# Patient Record
(demographics unavailable — no encounter records)

---

## 2020-08-27 NOTE — SLEEP CARE CONSULTATION
Information from patient questionnaire entered by Olya Duarte.





I have reviewed and concur with the information entered by Olya Duarte. 

This document represents the service I personally performed and the decisions 

made by me, Ute Yanes ARNP.





History of Present Illness


Service Date and Time: 08/27/2020    0810


Reason for Visit: New patient


Chief Complaint: reports: Snoring (only on back or right side), Observed pauses 

in breathing (wife says so).  denies: Insomnia, Unrefreshed sleep, Excessive 

daytime sleepiness, Fatigue


Date of Onset: maybe a year or so


Usual bedtime: 12 am


Time it takes to fall asleep: 2 minutes


Snores at night: Yes (sometimes)


Observed to quit breathing while asleep: Yes (per wife)


Sleeps alone due to snoring: No


Number of times waking at night: 1-2


Reasons for waking at night: reports: Bathroom.  denies: Choking, Snoring, 

Gasping for air


Toss, Turn, or Twitch while sleeping: No


Recalls having dreams: Yes


Usually gets out of bed at: 7-10 am


Feels refreshed in the morning: Yes


Morning headache: No


Sleepy or fatigued during the day: Yes


Ever fallen asleep while driving: No


Takes day naps: Yes (sometimes)


Dreams during day naps: No


Prior sleep studies: No


Additional HPI information: 





I had the pleasure of seeing CONRAD KAPOOR today regarding the possibility of 

him having a sleep disorder. His current complaints are observed pauses in riddhi

thing and snoring. He states he is here because his wife has a CPAP machine and 

she has told him that he has had pauses in his breathing. He states he snores 

only if he sleeps on his back or right side. He denies unrefreshed sleep or 

daytime sleepiness. He states he does get up 1-2 times on average for the 

bathroom. He has a history of coronary heart disease.








- Parasomnia Symptoms


Ever been unable to move upon waking from sleep: No


Walks in sleep: No


Talks in sleep: No


Ever acted out dreams in sleep: No


Ever felt weak in the knees when startled or emotional: No


Bothered by creepy, crawly, restless sensations in legs: No


Problems with memory or concentration: No





Subjective


Initial Webster Sleepiness Scale score: 4 (in 2020)





Past Medical History


Past Medical History: reports: Coronary Heart Disease, Other (history of itching

problems, no known cause).  denies: Hypertension, Claustrophobia, Congestive 

Heart Failure, Diabetes, Arrythmia, Hypothyroidism, Anemia, Anxiety, Impotence, 

Depression, Mood disorder, GERD, Attention deficit





Social History


The patient's occupation is a [employer]. Patient is  and lives in [place].







Have you smoked in the past 12 months: No


Alcohol use: Yes


Alcohol amount and frequency: 2-3 drinks a day


Caffeine use: No





Family History


Family history of sleep disordered breathing: No


Family Hx Sleep Apnea: Mother: Snoring, Father: Snoring, Sibling: Snoring





Allergies and Home Medications


Drug allergies reviewed: Yes (NKDA)


Home medication list reviewed: Yes


Allergy and home medication list: 





atorvastatin


metoprolol


lisinopril


gabapentin


aspirin





Review of Systems


Cardiovascular: denies: high blood pressure, palpitations, chest pain, irregular

heart rate or pulse, leg or foot swelling


Respiratory: denies: shortness of breath, chronic cough


Gastrointestinal: denies: heartburn, difficulty swallowing


Urinary: denies: impotence


Neurological: denies: headaches, seizure, head trauma, speech dysfunction, gait 

or balance problems


Psychiatric: denies: Attention Deficit Hyperactivity, anxiety, depression, mood 

disorder, claustrophobia


Ear/Nose/Throat: reports: dry mouth/throat (only if sleeps with mouth open), 

tonsillectomy, wisdom teeth removed (may still have a couple).  denies: nasal 

congestion, sinus problems, nose bleeds, injury to nose


Endocrine: denies: thyroid disease


Musculoskeletal: denies: muscle pain or cramping, mobility problems


Immunologic: reports: itching, allergies to food or environment (molds)





Physical Exam


Blood Pressure: 130/75


Cuff size: regular


Heart Rate: 56


O2 Saturation: 96


Height: 6 ft 1.5 in


Weight: 208 lb


Body Mass Index: 27.1


BMI Classification: Overweight


HEENT: No craniofacial malformation


Nostrils: patent to airflow


Turbinates: normal


Septum: deviated left


Mouth and throat: narrow oropharynx


Soft palate: normal


Hard palate: arched


Uvula: normal


Uvula visualization: 25% Mallampati Class III


Tongue: normal in size


Tonsils: absent bilaterally


Chin and jaw: normal size and position


Neck: normal w/o lymphadenopathy or thyromegaly


Heart: regular rate and rhythm


Lungs: clear bilaterally





Impression and Plan





1. Suspected Obstructive Sleep Apnea-Hypopnea Syndrome, as suggested by a 

history of irregular snoring and observed cessation of breath while asleep. 

Patient is overweight and has a history of coronary heart disease. Narrow 

oropharynx and obesity are common predisposing factors for obstructive sleep 

apnea-hypopnea syndrome.  I recommend proceeding to polysomnography to confirm 

the diagnosis and to assess severity. If the patient has significant sleep 

disordered breathing, a manual CPAP titration study will also be performed to 

find the optimal treatment pressure. I informed the patient of what the sleep 

studies involve and after some discussion, obtained agreement to proceed. The 

pathophysiology of obstructive sleep apnea-hypopnea syndrome was discussed with 

the patient and health risks of cardiovascular and cerebrovascular disease if 

not treated. AAS brochure for obstructive sleep apnea-hypopnea syndrome given 

and reviewed. Risks of drowsy driving discussed in detail and patient advised to

avoid long distance driving and to pull over at the first sign of drowsiness. 

Patient agreed to plan. 








* Schedule polysomnography +- manual CPAP titration study.


* Avoid long distance driving or driving when feeling sleepy.


* Avoid alcohol, sedative and muscle relaxant around bedtime.


* Attempt to lose weight.


* Review instructions provided by trained office staff on how to prepare for the

  sleep study.


* Return for follow-up after sleep study completed.








Visit Type: In Office


Time Spent with Patient (minutes): 30


Provider Statement: I spent 100% of the Face to Face Visit with the patient with

greater than 50% spent counseling the patient and coordination of care.